# Patient Record
Sex: MALE | Race: WHITE | NOT HISPANIC OR LATINO | Employment: FULL TIME | URBAN - NONMETROPOLITAN AREA
[De-identification: names, ages, dates, MRNs, and addresses within clinical notes are randomized per-mention and may not be internally consistent; named-entity substitution may affect disease eponyms.]

---

## 2022-07-07 PROCEDURE — U0004 COV-19 TEST NON-CDC HGH THRU: HCPCS | Performed by: NURSE PRACTITIONER

## 2023-04-11 ENCOUNTER — OFFICE VISIT (OUTPATIENT)
Dept: PSYCHIATRY | Facility: CLINIC | Age: 33
End: 2023-04-11
Payer: COMMERCIAL

## 2023-04-11 DIAGNOSIS — F41.8 DEPRESSION WITH ANXIETY: Primary | ICD-10-CM

## 2023-04-11 PROCEDURE — 90791 PSYCH DIAGNOSTIC EVALUATION: CPT

## 2023-04-11 RX ORDER — LIDOCAINE HYDROCHLORIDE 20 MG/ML
SOLUTION OROPHARYNGEAL
COMMUNITY
Start: 2023-04-07

## 2023-04-11 NOTE — PROGRESS NOTES
"  Initial Adult Evaluation      Date Encounter: 2023   Name: Wesly Lyon  MRN: [id number]  : 1990    Time In: 11:00 am  Time Out: 11:56 am     Referring Provider: Provider, No Known    Chief Complaint:      ICD-10-CM ICD-9-CM   1. Depression with anxiety  F41.8 300.4        History of Present Illness:   Wesly Lyon is a 32 y.o. male who is being seen today for counseling for depression and anxiety.  Patient is here today seeking help with his strained relationship with his girlfriend.  Patient reports discovering \"she's cheating and wanting to sleep around\".  He reports feeling devastates as he had just proposed last week and acknowledges \"I've always had trust issues with my girlfriend\".  He reports he cannot sleep and has lost his appetite.  Patient states, \"I think I have BPD\".  He would like to \"figure out what's really wrong with me\" and \"I feel like I people don't understand me and I don't understand them\".  Wesly reports low self-esteem and feels he is an introvert.  Patient adamantly and convincingly denies current suicidal or homicidal ideation or perceptual disturbance.  PHQ-9 score indicates severe depression.  WES-7 score indicates severe anxiety.     Past Psychiatric History:   None    Subjective      PHQ-9 Depression Screening  Little interest or pleasure in doing things? 1-->several days   Feeling down, depressed, or hopeless? 2-->more than half the days   Trouble falling or staying asleep, or sleeping too much? 3-->nearly every day   Feeling tired or having little energy? 3-->nearly every day   Poor appetite or overeating? 3-->nearly every day   Feeling bad about yourself - or that you are a failure or have let yourself or your family down? 3-->nearly every day   Trouble concentrating on things, such as reading the newspaper or watching television? 3-->nearly every day   Moving or speaking so slowly that other people could have noticed? Or the opposite - being so fidgety or " restless that you have been moving around a lot more than usual? 3-->nearly every day   Thoughts that you would be better off dead, or of hurting yourself in some way? 1-->several days   PHQ-9 Total Score 22   If you checked off any problems, how difficult have these problems made it for you to do your work, take care of things at home, or get along with other people? somewhat difficult     WES-7 Total Score:   WES-7 Total Score: WES-7  Feeling nervous, anxious or on edge: Nearly every day  Not being able to stop or control worrying: Nearly every day  Worrying too much about different things: Nearly every day  Trouble Relaxing: Nearly every day  Being so restless that it is hard to sit still: Nearly every day  Feeling afraid as if something awful might happen: Nearly every day  Becoming easily annoyed or irritable: More than half the days  WES 7 Total Score: 20  If you checked any problems, how difficult have these problems made it for you to do your work, take care of things at home, or get along with other people: Somewhat difficult    Patient's Support Network Includes:  girlfriend, mother, and close friends    Functional Status: Mild impairment     Significant Life Events:   Verbal, physical, sexual abuse? no  Has patient experienced a death / loss of relationship? no  Has patient been through or witnessed a divorce?  yes  Has patient experienced a major accident or tragic events? yes    Work History:   Highest level of education obtained: 12th grade  Ever been active duty in the ? no  Patient's Occupation: Petroleum Engineer    Interpersonal/Relational:  Marital Status: single  Support system: girlfriend, mother, and close friends  Difficulty getting along with peers: no  Difficulty making new friendships: yes  Difficulty maintaining friendships: no  Close with family members: yes    Mental/Behavioral Health History:  History of prior treatment or hospitalization: none  Past diagnoses: no diagnoses  Are  there any significant health issues (current or past): no  History of seizures: no    Family Psychiatric History:  Unknown    History of Substance Use:  Patient answered yes      Substance Age Frequency Amount Method Last use   Nicotine        Alcohol 25 weekly 3  04/10/23   Marijuana        Benzo        Pain Pills        Cocaine        Meth        Heroin        Suboxone        Synthetics        Fentanyl            Triggers: (Persons/Places/Things/Events/Thought/Emotions): certain smells, tastes, weather     Social History:   Social History     Socioeconomic History   • Marital status: Single   Tobacco Use   • Smoking status: Never   • Smokeless tobacco: Never   Vaping Use   • Vaping Use: Never used   Substance and Sexual Activity   • Alcohol use: Yes     Comment: occ   • Drug use: Never   • Sexual activity: Defer        Past Medical History: History reviewed. No pertinent past medical history.    Past Surgical History: History reviewed. No pertinent surgical history.    Family History: History reviewed. No pertinent family history.    Medications:     Current Outpatient Medications:   •  acetaminophen (TYLENOL) 500 MG tablet, Take 1 tablet by mouth Every 6 (Six) Hours As Needed for Mild Pain., Disp: , Rfl:   •  Lidocaine Viscous HCl (XYLOCAINE) 2 % solution, TAKE 2 ML BY MOUTH FOUR TIMES DAILY AS NEEDED FOR MILD TO MODERATE PAIN, Disp: , Rfl:   •  ondansetron ODT (ZOFRAN-ODT) 4 MG disintegrating tablet, Place 1 tablet on the tongue Every 6 (Six) Hours As Needed for Nausea., Disp: 12 tablet, Rfl: 0    Allergies:   No Known Allergies     Objective       Mental Status Exam:   Hygiene:   good  Cooperation:  Cooperative  Eye Contact:  Downcast  Psychomotor Behavior:  Restless  Affect:  Euphoric  Mood: anxious  Hopelessness: 5  Speech:  Normal  Thought Process:  Goal directed  Thought Content:  Mood congruent  Suicidal:  None, had thoughts over the weekend  Homicidal:  None  Hallucinations:  None  Delusion:   Paranoid  Memory:  Intact  Orientation:  Person, Place, Time and Situation  Reliability:  good  Insight:  Fair  Judgement:  Fair  Impulse Control:  Poor, spending and porn    SUICIDE RISK ASSESSMENT/CSSRS  1. Does patient have thoughts of suicide? no  2. Does patient have intent for suicide? no  3. Does patient have a current plan for suicide? no  4. History of suicide attempts: no  5. Family history of suicide or attempts: no  6. History of violent behaviors towards others or property or thoughts of committing suicide: yes, lashing out  7. History of sexual aggression toward others: no  8. Access to firearms or weapons: no    Assessment / Plan      Visit Diagnosis/Orders Placed This Visit:    ICD-10-CM ICD-9-CM   1. Depression with anxiety  F41.8 300.4        PLAN:    Progress toward goal: Not at goal    Prognosis: Good with ongoing treatment    1. Safety: Patient has a history of suicidal ideation.  This is patient's first session.  Therapist and patient reviewed options for help including calling [phone], 508 the suicide hotline, and going to the neared ER.  Therapist will continue to monitor.  2. Risk Assessment: Risk of self-harm acutely is low. Risk of self-harm chronically is also low, but could be further elevated in the event of treatment noncompliance and/or AODA.    Treatment Plan/Goals: Continue supportive psychotherapy efforts and medications as indicated. Treatment and medication options discussed during today's visit. Patient acknowledged and verbally consented to continue with current treatment plan and was educated on the importance of compliance with treatment and follow-up appointments. Patient seems reasonably able to adhere to treatment plan.      Assisted Patient in processing above session content; acknowledged and normalized patient’s thoughts, feelings, and concerns.  Rationalized patient thought process regarding his emotions, desire to improve communication skills, learn health coping skills,  and desired treatment goals.      Allowed Patient to freely discuss issues  without interruption or judgement with unconditional positive regard, active listening skills, and empathy. Therapist provided a safe, confidential environment to facilitate the development of a positive therapeutic relationship and encouraged open, honest communication. Assisted Patient in identifying risk factors which would indicate the need for higher level of care including thoughts to harm self or others and/or self-harming behavior and encouraged Patient to contact this office, call 911, or present to the nearest emergency room should any of these events occur. Discussed crisis intervention services and means to access. Patient adamantly and convincingly denies current suicidal or homicidal ideation or perceptual disturbance. Assisted Patient in processing session content; acknowledged and normalized Patient’s thoughts, feelings, and concerns by utilizing a person-centered approach in efforts to build appropriate rapport and a positive therapeutic relationship with open and honest communication.     Follow Up:   Return in about 2 weeks (around 4/25/2023) for Therapy, Follow up with Marcelle or female provider.       This document has been electronically signed by Mary Solano LCSW  April 13, 2023 08:13 EDT